# Patient Record
Sex: FEMALE | Race: WHITE | NOT HISPANIC OR LATINO | URBAN - NONMETROPOLITAN AREA
[De-identification: names, ages, dates, MRNs, and addresses within clinical notes are randomized per-mention and may not be internally consistent; named-entity substitution may affect disease eponyms.]

---

## 2021-05-20 ENCOUNTER — OFFICE (OUTPATIENT)
Dept: URBAN - NONMETROPOLITAN AREA CLINIC 5 | Facility: CLINIC | Age: 86
End: 2021-05-20
Payer: COMMERCIAL

## 2021-05-20 VITALS
DIASTOLIC BLOOD PRESSURE: 77 MMHG | RESPIRATION RATE: 20 BRPM | HEART RATE: 68 BPM | SYSTOLIC BLOOD PRESSURE: 166 MMHG | WEIGHT: 164 LBS | TEMPERATURE: 97.4 F

## 2021-05-20 DIAGNOSIS — R11.2 NAUSEA WITH VOMITING, UNSPECIFIED: ICD-10-CM

## 2021-05-20 DIAGNOSIS — R10.13 EPIGASTRIC PAIN: ICD-10-CM

## 2021-05-20 PROCEDURE — 99213 OFFICE O/P EST LOW 20 MIN: CPT | Performed by: INTERNAL MEDICINE

## 2021-05-20 NOTE — SERVICENOTES
I discussed with the patient and her daughter that I believe her recent illness could be attributed to an acute gastroenteritis.  The CT findings could fit with that as well.  I would expect this to resolve completely as it has and symptom should not return.  If she does have return of symptoms, then at that point of feel that outpatient upper endoscopy would be indicated.  The patient and her daughter know to call should this occur.

## 2021-05-20 NOTE — SERVICEHPINOTES
Lorena Alves   is a   88   year old  female   who is here today for routine follow up.The patient is an 88-year-old white female with a history of coronary artery disease with previous CABG, coronary stents, hypertension, osteoarthritis who was referred to see me today for recent symptoms of epigastric pain with nausea and vomiting. Approximately 2 weeks ago she developed symptoms of nausea and thinks she vomited a couple of times. She also had some epigastric discomfort and was sent to the emergency room by her local physician. Her evaluation there included lab work with a hematocrit of 44, MCV 94 and normal liver enzymes. A CT scan of the abdomen and pelvis was performed on that same day that revealed some thickening in the wall of the stomach normal liver, gallbladder and pancreas. Her last EGD was 11/07/2007 revealing gastritis and her last colonoscopy was 07/07/2008 revealing diverticulosis. She did lose for 5 lb during her acute illness but seems to be getting this back. She is no longer having any upper abdominal pain, nausea, or vomiting. She feels well.